# Patient Record
Sex: MALE | URBAN - METROPOLITAN AREA
[De-identification: names, ages, dates, MRNs, and addresses within clinical notes are randomized per-mention and may not be internally consistent; named-entity substitution may affect disease eponyms.]

---

## 2019-06-01 ENCOUNTER — EMERGENCY (EMERGENCY)
Facility: HOSPITAL | Age: 54
LOS: 0 days | Discharge: HOME | End: 2019-06-01
Attending: STUDENT IN AN ORGANIZED HEALTH CARE EDUCATION/TRAINING PROGRAM | Admitting: STUDENT IN AN ORGANIZED HEALTH CARE EDUCATION/TRAINING PROGRAM
Payer: MEDICAID

## 2019-06-01 VITALS
DIASTOLIC BLOOD PRESSURE: 75 MMHG | SYSTOLIC BLOOD PRESSURE: 125 MMHG | TEMPERATURE: 98 F | OXYGEN SATURATION: 97 % | HEART RATE: 76 BPM | RESPIRATION RATE: 18 BRPM

## 2019-06-01 VITALS
RESPIRATION RATE: 18 BRPM | HEART RATE: 78 BPM | OXYGEN SATURATION: 97 % | SYSTOLIC BLOOD PRESSURE: 111 MMHG | TEMPERATURE: 98 F | DIASTOLIC BLOOD PRESSURE: 68 MMHG

## 2019-06-01 DIAGNOSIS — R11.10 VOMITING, UNSPECIFIED: ICD-10-CM

## 2019-06-01 DIAGNOSIS — R11.2 NAUSEA WITH VOMITING, UNSPECIFIED: ICD-10-CM

## 2019-06-01 DIAGNOSIS — R42 DIZZINESS AND GIDDINESS: ICD-10-CM

## 2019-06-01 LAB
ALBUMIN SERPL ELPH-MCNC: 4.4 G/DL — SIGNIFICANT CHANGE UP (ref 3.5–5.2)
ALP SERPL-CCNC: 60 U/L — SIGNIFICANT CHANGE UP (ref 30–115)
ALT FLD-CCNC: 29 U/L — SIGNIFICANT CHANGE UP (ref 0–41)
ANION GAP SERPL CALC-SCNC: 13 MMOL/L — SIGNIFICANT CHANGE UP (ref 7–14)
AST SERPL-CCNC: 21 U/L — SIGNIFICANT CHANGE UP (ref 0–41)
BASOPHILS # BLD AUTO: 0.05 K/UL — SIGNIFICANT CHANGE UP (ref 0–0.2)
BASOPHILS NFR BLD AUTO: 0.3 % — SIGNIFICANT CHANGE UP (ref 0–1)
BILIRUB SERPL-MCNC: 0.5 MG/DL — SIGNIFICANT CHANGE UP (ref 0.2–1.2)
BUN SERPL-MCNC: 21 MG/DL — HIGH (ref 10–20)
CALCIUM SERPL-MCNC: 9.5 MG/DL — SIGNIFICANT CHANGE UP (ref 8.5–10.1)
CHLORIDE SERPL-SCNC: 105 MMOL/L — SIGNIFICANT CHANGE UP (ref 98–110)
CO2 SERPL-SCNC: 23 MMOL/L — SIGNIFICANT CHANGE UP (ref 17–32)
CREAT SERPL-MCNC: 1 MG/DL — SIGNIFICANT CHANGE UP (ref 0.7–1.5)
EOSINOPHIL # BLD AUTO: 0.02 K/UL — SIGNIFICANT CHANGE UP (ref 0–0.7)
EOSINOPHIL NFR BLD AUTO: 0.1 % — SIGNIFICANT CHANGE UP (ref 0–8)
GLUCOSE SERPL-MCNC: 164 MG/DL — HIGH (ref 70–99)
HCT VFR BLD CALC: 44.8 % — SIGNIFICANT CHANGE UP (ref 42–52)
HGB BLD-MCNC: 14.3 G/DL — SIGNIFICANT CHANGE UP (ref 14–18)
IMM GRANULOCYTES NFR BLD AUTO: 0.6 % — HIGH (ref 0.1–0.3)
LACTATE SERPL-SCNC: 1.7 MMOL/L — SIGNIFICANT CHANGE UP (ref 0.5–2.2)
LIDOCAIN IGE QN: 24 U/L — SIGNIFICANT CHANGE UP (ref 7–60)
LYMPHOCYTES # BLD AUTO: 0.85 K/UL — LOW (ref 1.2–3.4)
LYMPHOCYTES # BLD AUTO: 4.7 % — LOW (ref 20.5–51.1)
MCHC RBC-ENTMCNC: 22.1 PG — LOW (ref 27–31)
MCHC RBC-ENTMCNC: 31.9 G/DL — LOW (ref 32–37)
MCV RBC AUTO: 69.1 FL — LOW (ref 80–94)
MONOCYTES # BLD AUTO: 1.49 K/UL — HIGH (ref 0.1–0.6)
MONOCYTES NFR BLD AUTO: 8.3 % — SIGNIFICANT CHANGE UP (ref 1.7–9.3)
NEUTROPHILS # BLD AUTO: 15.42 K/UL — HIGH (ref 1.4–6.5)
NEUTROPHILS NFR BLD AUTO: 86 % — HIGH (ref 42.2–75.2)
NRBC # BLD: 0 /100 WBCS — SIGNIFICANT CHANGE UP (ref 0–0)
PLATELET # BLD AUTO: 235 K/UL — SIGNIFICANT CHANGE UP (ref 130–400)
POTASSIUM SERPL-MCNC: 3.9 MMOL/L — SIGNIFICANT CHANGE UP (ref 3.5–5)
POTASSIUM SERPL-SCNC: 3.9 MMOL/L — SIGNIFICANT CHANGE UP (ref 3.5–5)
PROT SERPL-MCNC: 7.5 G/DL — SIGNIFICANT CHANGE UP (ref 6–8)
RBC # BLD: 6.48 M/UL — HIGH (ref 4.7–6.1)
RBC # FLD: 17.8 % — HIGH (ref 11.5–14.5)
SODIUM SERPL-SCNC: 141 MMOL/L — SIGNIFICANT CHANGE UP (ref 135–146)
WBC # BLD: 17.94 K/UL — HIGH (ref 4.8–10.8)
WBC # FLD AUTO: 17.94 K/UL — HIGH (ref 4.8–10.8)

## 2019-06-01 PROCEDURE — 93010 ELECTROCARDIOGRAM REPORT: CPT

## 2019-06-01 PROCEDURE — 99284 EMERGENCY DEPT VISIT MOD MDM: CPT

## 2019-06-01 RX ORDER — FAMOTIDINE 10 MG/ML
20 INJECTION INTRAVENOUS ONCE
Refills: 0 | Status: COMPLETED | OUTPATIENT
Start: 2019-06-01 | End: 2019-06-01

## 2019-06-01 RX ORDER — ONDANSETRON 8 MG/1
4 TABLET, FILM COATED ORAL ONCE
Refills: 0 | Status: COMPLETED | OUTPATIENT
Start: 2019-06-01 | End: 2019-06-01

## 2019-06-01 RX ORDER — SODIUM CHLORIDE 9 MG/ML
2000 INJECTION, SOLUTION INTRAVENOUS ONCE
Refills: 0 | Status: COMPLETED | OUTPATIENT
Start: 2019-06-01 | End: 2019-06-01

## 2019-06-01 RX ADMIN — FAMOTIDINE 20 MILLIGRAM(S): 10 INJECTION INTRAVENOUS at 14:11

## 2019-06-01 RX ADMIN — SODIUM CHLORIDE 1000 MILLILITER(S): 9 INJECTION, SOLUTION INTRAVENOUS at 14:11

## 2019-06-01 RX ADMIN — ONDANSETRON 4 MILLIGRAM(S): 8 TABLET, FILM COATED ORAL at 14:10

## 2019-06-01 NOTE — ED ADULT NURSE NOTE - PMH
Hepatitis B infection without delta agent without hepatic coma, unspecified chronicity    Hypertension, unspecified type

## 2019-06-01 NOTE — ED ADULT TRIAGE NOTE - CHIEF COMPLAINT QUOTE
#[id number] BIBA for nausea vomiting and dizziness since 12noon, blood pressure was taken at home and was 71/53, pt has been feeling weakness x 3 days

## 2019-06-01 NOTE — ED PROVIDER NOTE - OBJECTIVE STATEMENT
52 y/o male with pmhx of HTN presents with vomiting. Per daughter, patient began vomiting NBNB approximately 1.5 hours prior to presentation, associated with lightheadedness; at that time, daughter measured patient's BP and found it to be 78/53. Patient denies fevers/chills, sob, chest pain, abdominal pain, diarrhea/constipation, hx of abdominal surgeries, dysuria.

## 2019-06-01 NOTE — ED PROVIDER NOTE - ATTENDING CONTRIBUTION TO CARE
54 y/o M pmh htn, p/w nb/nb vomiting x3 about 30 min after eating steamed rice and meat, given to him by friend. No sick contact or travel. Felt light headed; BP immediately after reported low. In ED pt feels well, has no complaints, abd s/nt 54 y/o M pmh htn, p/w nb/nb vomiting x3 about 30 min after eating steamed rice and meat, given to him by friend. No sick contact or travel. Felt light headed; BP immediately after reported low. In ED pt feels well, has no complaints, abd s/nt, well appearing. IMP: n/v. P labs, ivf, zofran, reassess.

## 2019-06-01 NOTE — ED ADULT NURSE NOTE - OBJECTIVE STATEMENT
Patient c/o vomiting NBNB emesis x2 this morning. Patient also states he had low blood pressure and weakness. Denies fever/d/sob/cp. On assessment patient shows no s/s of distress, skin pink. A&OX4.
liquor

## 2019-06-01 NOTE — ED ADULT NURSE NOTE - CHIEF COMPLAINT QUOTE
#[id number] BIBA for nausea vomiting and dizziness since 12noon, blood pressure was taken at home and was 71/53, pt has been feeling weakness x 3 days

## 2019-06-01 NOTE — ED PROVIDER NOTE - CLINICAL SUMMARY MEDICAL DECISION MAKING FREE TEXT BOX
No acute ED events. + PO tolerant. Pt has no sx and eager to go home. Pt stable for dc w/ PMD f/up, and care as discussed.  Pt/ family understands plan and signs and symptoms for ED return.

## 2019-06-01 NOTE — ED PROVIDER NOTE - NS ED ROS FT
Constitutional: See HPI.  Eyes: No visual changes, eye pain or discharge.  ENMT: No hearing changes, pain, discharge or infections. No neck pain or stiffness.  Cardiac: No chest pain, SOB or edema. No chest pain with exertion.  Respiratory: No cough or respiratory distress.   GI: + nausea, vomiting; No diarrhea or abdominal pain.  : No dysuria, frequency or burning.  MS: No myalgia, muscle weakness, joint pain or back pain.  Neuro: No headache or weakness. No LOC.  Skin: No skin rash.  Endo: No hx of DM, thyroid disease  Except as documented in HPI, all other review of systems is negative

## 2019-06-01 NOTE — ED ADULT NURSE NOTE - NSIMPLEMENTINTERV_GEN_ALL_ED
Implemented All Universal Safety Interventions:  [place] to call system. Call bell, personal items and telephone within reach. Instruct patient to call for assistance. Room bathroom lighting operational. Non-slip footwear when patient is off stretcher. Physically safe environment: no spills, clutter or unnecessary equipment. Stretcher in lowest position, wheels locked, appropriate side rails in place.

## 2019-07-01 ENCOUNTER — OUTPATIENT (OUTPATIENT)
Dept: OUTPATIENT SERVICES | Facility: HOSPITAL | Age: 54
LOS: 1 days | End: 2019-07-01
Payer: MEDICAID

## 2019-07-01 PROCEDURE — G9001: CPT

## 2019-07-09 DIAGNOSIS — Z71.89 OTHER SPECIFIED COUNSELING: ICD-10-CM

## 2019-07-09 PROBLEM — B19.10 UNSPECIFIED VIRAL HEPATITIS B WITHOUT HEPATIC COMA: Chronic | Status: ACTIVE | Noted: 2019-06-01

## 2019-07-09 PROBLEM — I10 ESSENTIAL (PRIMARY) HYPERTENSION: Chronic | Status: ACTIVE | Noted: 2019-06-01

## 2024-09-23 ENCOUNTER — EMERGENCY (EMERGENCY)
Facility: HOSPITAL | Age: 59
LOS: 0 days | Discharge: HOME | End: 2024-09-24
Attending: EMERGENCY MEDICINE
Payer: MEDICAID

## 2024-09-23 VITALS
DIASTOLIC BLOOD PRESSURE: 87 MMHG | SYSTOLIC BLOOD PRESSURE: 135 MMHG | RESPIRATION RATE: 20 BRPM | HEART RATE: 59 BPM | OXYGEN SATURATION: 97 % | TEMPERATURE: 98 F

## 2024-09-23 LAB
ACANTHOCYTES BLD QL SMEAR: SIGNIFICANT CHANGE UP
ALBUMIN SERPL ELPH-MCNC: 4.7 G/DL — SIGNIFICANT CHANGE UP (ref 3.5–5.2)
ALP SERPL-CCNC: 71 U/L — SIGNIFICANT CHANGE UP (ref 30–115)
ALT FLD-CCNC: 26 U/L — SIGNIFICANT CHANGE UP (ref 0–41)
ANION GAP SERPL CALC-SCNC: 11 MMOL/L — SIGNIFICANT CHANGE UP (ref 7–14)
ANISOCYTOSIS BLD QL: SLIGHT — SIGNIFICANT CHANGE UP
APTT BLD: 33.3 SEC — SIGNIFICANT CHANGE UP (ref 27–39.2)
AST SERPL-CCNC: 22 U/L — SIGNIFICANT CHANGE UP (ref 0–41)
BASOPHILS # BLD AUTO: 0 K/UL — SIGNIFICANT CHANGE UP (ref 0–0.2)
BASOPHILS NFR BLD AUTO: 0 % — SIGNIFICANT CHANGE UP (ref 0–1)
BILIRUB SERPL-MCNC: 0.6 MG/DL — SIGNIFICANT CHANGE UP (ref 0.2–1.2)
BLD GP AB SCN SERPL QL: SIGNIFICANT CHANGE UP
BUN SERPL-MCNC: 14 MG/DL — SIGNIFICANT CHANGE UP (ref 10–20)
BURR CELLS BLD QL SMEAR: PRESENT — SIGNIFICANT CHANGE UP
CALCIUM SERPL-MCNC: 9.2 MG/DL — SIGNIFICANT CHANGE UP (ref 8.4–10.5)
CHLORIDE SERPL-SCNC: 99 MMOL/L — SIGNIFICANT CHANGE UP (ref 98–110)
CO2 SERPL-SCNC: 22 MMOL/L — SIGNIFICANT CHANGE UP (ref 17–32)
CREAT SERPL-MCNC: 0.7 MG/DL — SIGNIFICANT CHANGE UP (ref 0.7–1.5)
EGFR: 105 ML/MIN/1.73M2 — SIGNIFICANT CHANGE UP
EOSINOPHIL # BLD AUTO: 0 K/UL — SIGNIFICANT CHANGE UP (ref 0–0.7)
EOSINOPHIL NFR BLD AUTO: 0 % — SIGNIFICANT CHANGE UP (ref 0–8)
GLUCOSE SERPL-MCNC: 157 MG/DL — HIGH (ref 70–99)
HCT VFR BLD CALC: 43.5 % — SIGNIFICANT CHANGE UP (ref 42–52)
HGB BLD-MCNC: 13.9 G/DL — LOW (ref 14–18)
INR BLD: 1.03 RATIO — SIGNIFICANT CHANGE UP (ref 0.65–1.3)
LYMPHOCYTES # BLD AUTO: 1.28 K/UL — SIGNIFICANT CHANGE UP (ref 1.2–3.4)
LYMPHOCYTES # BLD AUTO: 16.7 % — LOW (ref 20.5–51.1)
MANUAL SMEAR VERIFICATION: SIGNIFICANT CHANGE UP
MCHC RBC-ENTMCNC: 22.1 PG — LOW (ref 27–31)
MCHC RBC-ENTMCNC: 32 G/DL — SIGNIFICANT CHANGE UP (ref 32–37)
MCV RBC AUTO: 69.3 FL — LOW (ref 80–94)
MICROCYTES BLD QL: SLIGHT — SIGNIFICANT CHANGE UP
MONOCYTES # BLD AUTO: 0.34 K/UL — SIGNIFICANT CHANGE UP (ref 0.1–0.6)
MONOCYTES NFR BLD AUTO: 4.4 % — SIGNIFICANT CHANGE UP (ref 1.7–9.3)
NEUTROPHILS # BLD AUTO: 5.03 K/UL — SIGNIFICANT CHANGE UP (ref 1.4–6.5)
NEUTROPHILS NFR BLD AUTO: 65.8 % — SIGNIFICANT CHANGE UP (ref 42.2–75.2)
OVALOCYTES BLD QL SMEAR: SLIGHT — SIGNIFICANT CHANGE UP
PLAT MORPH BLD: ABNORMAL
PLATELET # BLD AUTO: 226 K/UL — SIGNIFICANT CHANGE UP (ref 130–400)
PMV BLD: 10.5 FL — HIGH (ref 7.4–10.4)
POIKILOCYTOSIS BLD QL AUTO: SIGNIFICANT CHANGE UP
POLYCHROMASIA BLD QL SMEAR: SLIGHT — SIGNIFICANT CHANGE UP
POTASSIUM SERPL-MCNC: 3.5 MMOL/L — SIGNIFICANT CHANGE UP (ref 3.5–5)
POTASSIUM SERPL-SCNC: 3.5 MMOL/L — SIGNIFICANT CHANGE UP (ref 3.5–5)
PROT SERPL-MCNC: 7.5 G/DL — SIGNIFICANT CHANGE UP (ref 6–8)
PROTHROM AB SERPL-ACNC: 11.8 SEC — SIGNIFICANT CHANGE UP (ref 9.95–12.87)
RBC # BLD: 6.28 M/UL — HIGH (ref 4.7–6.1)
RBC # FLD: 17.3 % — HIGH (ref 11.5–14.5)
RBC BLD AUTO: ABNORMAL
SMUDGE CELLS # BLD: PRESENT — SIGNIFICANT CHANGE UP
SODIUM SERPL-SCNC: 132 MMOL/L — LOW (ref 135–146)
TROPONIN T, HIGH SENSITIVITY RESULT: <6 NG/L — SIGNIFICANT CHANGE UP (ref 6–21)
VARIANT LYMPHS # BLD: 13.1 % — HIGH (ref 0–5)
WBC # BLD: 7.65 K/UL — SIGNIFICANT CHANGE UP (ref 4.8–10.8)
WBC # FLD AUTO: 7.65 K/UL — SIGNIFICANT CHANGE UP (ref 4.8–10.8)

## 2024-09-23 PROCEDURE — 86901 BLOOD TYPING SEROLOGIC RH(D): CPT

## 2024-09-23 PROCEDURE — G0378: CPT

## 2024-09-23 PROCEDURE — 70450 CT HEAD/BRAIN W/O DYE: CPT | Mod: MC

## 2024-09-23 PROCEDURE — 70551 MRI BRAIN STEM W/O DYE: CPT | Mod: MC

## 2024-09-23 PROCEDURE — 96374 THER/PROPH/DIAG INJ IV PUSH: CPT | Mod: XU

## 2024-09-23 PROCEDURE — 70498 CT ANGIOGRAPHY NECK: CPT | Mod: 26,MC

## 2024-09-23 PROCEDURE — 99284 EMERGENCY DEPT VISIT MOD MDM: CPT | Mod: 25

## 2024-09-23 PROCEDURE — 70450 CT HEAD/BRAIN W/O DYE: CPT | Mod: 26,MC,59

## 2024-09-23 PROCEDURE — 93005 ELECTROCARDIOGRAM TRACING: CPT

## 2024-09-23 PROCEDURE — 70496 CT ANGIOGRAPHY HEAD: CPT | Mod: MC

## 2024-09-23 PROCEDURE — 99223 1ST HOSP IP/OBS HIGH 75: CPT

## 2024-09-23 PROCEDURE — 0042T: CPT | Mod: MC

## 2024-09-23 PROCEDURE — 86850 RBC ANTIBODY SCREEN: CPT

## 2024-09-23 PROCEDURE — 85610 PROTHROMBIN TIME: CPT

## 2024-09-23 PROCEDURE — 84484 ASSAY OF TROPONIN QUANT: CPT

## 2024-09-23 PROCEDURE — 71045 X-RAY EXAM CHEST 1 VIEW: CPT | Mod: 26

## 2024-09-23 PROCEDURE — 36415 COLL VENOUS BLD VENIPUNCTURE: CPT

## 2024-09-23 PROCEDURE — 85025 COMPLETE CBC W/AUTO DIFF WBC: CPT

## 2024-09-23 PROCEDURE — 93010 ELECTROCARDIOGRAM REPORT: CPT

## 2024-09-23 PROCEDURE — 86900 BLOOD TYPING SEROLOGIC ABO: CPT

## 2024-09-23 PROCEDURE — 71045 X-RAY EXAM CHEST 1 VIEW: CPT

## 2024-09-23 PROCEDURE — 85730 THROMBOPLASTIN TIME PARTIAL: CPT

## 2024-09-23 PROCEDURE — 80053 COMPREHEN METABOLIC PANEL: CPT

## 2024-09-23 PROCEDURE — 70496 CT ANGIOGRAPHY HEAD: CPT | Mod: 26,MC

## 2024-09-23 PROCEDURE — 70498 CT ANGIOGRAPHY NECK: CPT | Mod: MC

## 2024-09-23 RX ORDER — TENOFOVIR DISOPROXIL FUMARATE 300 MG/1
300 TABLET, COATED ORAL ONCE
Refills: 0 | Status: DISCONTINUED | OUTPATIENT
Start: 2024-09-24 | End: 2024-09-24

## 2024-09-23 RX ORDER — LISINOPRIL 10 MG/1
20 TABLET ORAL ONCE
Refills: 0 | Status: DISCONTINUED | OUTPATIENT
Start: 2024-09-24 | End: 2024-09-24

## 2024-09-23 RX ORDER — ONDANSETRON 2 MG/ML
4 INJECTION, SOLUTION INTRAMUSCULAR; INTRAVENOUS ONCE
Refills: 0 | Status: COMPLETED | OUTPATIENT
Start: 2024-09-23 | End: 2024-09-23

## 2024-09-23 RX ORDER — MECLIZINE HYDROCHLORIDE 25 MG/1
25 TABLET ORAL ONCE
Refills: 0 | Status: COMPLETED | OUTPATIENT
Start: 2024-09-23 | End: 2024-09-23

## 2024-09-23 RX ORDER — SODIUM CHLORIDE 9 MG/ML
1000 INJECTION INTRAMUSCULAR; INTRAVENOUS; SUBCUTANEOUS ONCE
Refills: 0 | Status: COMPLETED | OUTPATIENT
Start: 2024-09-23 | End: 2024-09-23

## 2024-09-23 RX ADMIN — ONDANSETRON 4 MILLIGRAM(S): 2 INJECTION, SOLUTION INTRAMUSCULAR; INTRAVENOUS at 13:58

## 2024-09-23 RX ADMIN — MECLIZINE HYDROCHLORIDE 25 MILLIGRAM(S): 25 TABLET ORAL at 15:42

## 2024-09-23 RX ADMIN — MECLIZINE HYDROCHLORIDE 25 MILLIGRAM(S): 25 TABLET ORAL at 15:14

## 2024-09-23 RX ADMIN — SODIUM CHLORIDE 1000 MILLILITER(S): 9 INJECTION INTRAMUSCULAR; INTRAVENOUS; SUBCUTANEOUS at 15:14

## 2024-09-23 NOTE — ED PROVIDER NOTE - CLINICAL SUMMARY MEDICAL DECISION MAKING FREE TEXT BOX
Patient feeling much better after CT imaging CT imaging unremarkable will place in ED observation unit and get MRI Patient feeling much better after CT imaging CT imaging unremarkable will place in ED observation unit and get MRI.

## 2024-09-23 NOTE — ED PROVIDER NOTE - PROGRESS NOTE DETAILS
pk: stroke code called on arrival, NIH 0, labs imaging reviewed, will place into Obs for MRI. pt reports improvement of symptoms after medication.

## 2024-09-23 NOTE — ED ADULT NURSE NOTE - NSFALLUNIVINTERV_ED_ALL_ED
Bed/Stretcher in lowest position, wheels locked, appropriate side rails in place/Call bell, personal items and telephone in reach/Instruct patient to call for assistance before getting out of bed/chair/stretcher/Non-slip footwear applied when patient is off stretcher/[place] to call system/Physically safe environment - no spills, clutter or unnecessary equipment/Purposeful proactive rounding/Room/bathroom lighting operational, light cord in reach

## 2024-09-23 NOTE — ED ADULT NURSE NOTE - NSICDXPASTMEDICALHX_GEN_ALL_CORE_FT
PAST MEDICAL HISTORY:  Hepatitis B infection without delta agent without hepatic coma, unspecified chronicity     Hypertension, unspecified type

## 2024-09-23 NOTE — ED ADULT TRIAGE NOTE - CHIEF COMPLAINT QUOTE
Pt BIBA from home for sudden onset vision loss to both eyes (now resolved) followed by persistent dizziness and vomiting  denies recent head trauma   fs 146   stroke code called

## 2024-09-23 NOTE — ED PROVIDER NOTE - OBJECTIVE STATEMENT
Patient is a 59y Male p/w dizziness, blurry vision and nausea that started at 11:15 this morning. Otherwise denies any fever, chills, headache, cough, congestion, cp, palpitations, sob, v/d, abd pain, constipation, urinary complaints, lower extremity pain/swelling.

## 2024-09-23 NOTE — ED CDU PROVIDER INITIAL DAY NOTE - ATTENDING APP SHARED VISIT CONTRIBUTION OF CARE
[employer]  [id number] used.     58 yo M pmh of HTN, HLD, hepatitis B presents with room spinning dizziness. + N/V. Stroke code on arrival. Patient reports similar episodes a few years ago.  labs, imaging done. no acute findings. Seen by neurology team who recommends observation for MRI. no chest pain, no shortness of breath, no palpitations. no fever or recent illness.     CONSTITUTIONAL: Well-developed; well-nourished; in no acute distress.   SKIN: warm, dry  HEAD: Normocephalic; atraumatic.  EYES: PERRL, EOMI, no conjunctival erythema  ENT: No nasal discharge; airway clear.  NECK: Supple; non tender.  CARD: S1, S2 normal;  Regular rate and rhythm.   RESP: No wheezes, rales or rhonchi.  ABD: soft non tender, non distended, no rebound or guarding  EXT: Normal ROM.  5/5 strength in all 4 extremities   LYMPH: No acute cervical adenopathy.  NEURO: A&Ox3, CN 2-11 intact, moving all extremities, 5/5 strength, equal sensation bilaterally  PSYCH: Cooperative, appropriate.

## 2024-09-23 NOTE — ED CDU PROVIDER INITIAL DAY NOTE - CLINICAL SUMMARY MEDICAL DECISION MAKING FREE TEXT BOX
Patient presents with room spinning dizziness. stroke code on arrival. labs, imaging done. placed in obs for MRI as per neurology. Patient presents with room spinning dizziness. stroke code on arrival. labs, imaging done. placed in obs for MRI as per neurology..

## 2024-09-23 NOTE — ED PROVIDER NOTE - ATTENDING CONTRIBUTION TO CARE
59-year-old male to ED with dizziness and blurry vision that started at 11:15 AM.  He was in the yard called his daughter who evaluated him and called EMS.  Patient states he is feels everything is blurry and was feeling nauseous. Afebrile vital signs stable exam as noted clear lungs bilaterally conjunctiva pink HEENT normal, regular rate and rhythm abdomen soft nontender and neuro nonfocal. + nystagmus. 59-year-old male to ED with dizziness and blurry vision that started at 11:15 AM.  He was in the yard called his daughter who evaluated him and called EMS.  Patient states he is feels everything is blurry and was feeling nauseous. Afebrile vital signs stable exam as noted clear lungs bilaterally conjunctiva pink HEENT normal, regular rate and rhythm abdomen soft nontender and neuro nonfocal. + nystagmus.    Stroke code activated on arrival stroke team at bedside.  NIH stroke scale equals 1

## 2024-09-23 NOTE — ED CDU PROVIDER INITIAL DAY NOTE - OBJECTIVE STATEMENT
58 y/o M, PMHx HLD. HTN, NIDDM & Hepatitis B (on Tenofovir x many years), presented to the ED with complaints of dizziness that began this AM at approximately 1115. Patient admits to accompanying nausea and admits to exacerbation of symptoms of positional changes. He admits to two prior episodes of the same - last was approximately 2-3 years ago for which he came to the hospital however was never admitted. He denies any accompanying headache, fever, chills, recent illness, chest pain, dyspnea, abdominal pain and back pain. A stroke code was called upon patient arrival; neurology ultimately recommended observation for an MRI following negative CT/CTA.

## 2024-09-23 NOTE — ED CDU PROVIDER INITIAL DAY NOTE - PROGRESS NOTE DETAILS
Patient Cantonese speaking; offered translation services however he declined and requested for his daughter to translate.

## 2024-09-23 NOTE — CONSULT NOTE ADULT - SUBJECTIVE AND OBJECTIVE BOX
**STROKE CODE CONSULT NOTE**    Last known well time/Time of onset of symptoms: 11:15     HPI: 59y Male with PMHx of HLD. HTN, nonsmoker, not on any AP/AC, presenting to ED with sudden onset room spinning dizziness, involuntary eye movements, multiple episodes of vomiting starting at 11:15 AM. Pt was unable to keep his eyes open at home due to extreme dizziness, states he has b/l blurry vision at this time.  Stroke code in crit, /87. Pt denies hx of stroke, seizure, recent neck manipulations, neck pain, speech changes, focal deficits.       T(C): 36.4 (09-23-24 @ 12:43), Max: 36.4 (09-23-24 @ 12:43)  HR: 59 (09-23-24 @ 12:43) (59 - 59)  BP: 135/87 (09-23-24 @ 12:43) (135/87 - 135/87)  RR: 20 (09-23-24 @ 12:43) (20 - 20)  SpO2: 97% (09-23-24 @ 12:43) (97% - 97%)    PAST MEDICAL & SURGICAL HISTORY:  Hypertension, unspecified type      Hepatitis B infection without delta agent without hepatic coma, unspecified chronicity          FAMILY HISTORY:      SOCIAL HISTORY:  Denies smoking, drinking, or drug use    ROS: ***  Constitutional: No fever, weight loss or fatigue  Eyes: No eye pain, visual disturbances, or discharge  ENMT:  No difficulty hearing, tinnitus, vertigo; No sinus or throat pain  Neck: No pain or stiffness  Respiratory: No cough, wheezing, chills or hemoptysis  Cardiovascular: No chest pain, palpitations, shortness of breath, dizziness or leg swelling  Gastrointestinal: No abdominal pain. No nausea, vomiting or hematemesis; No diarrhea or constipation. Nohematochezia.  Genitourinary: No dysuria, frequency, hematuria or incontinence  Neurological: As per HPI  Skin: No itching, burning, rashes or lesions   Endocrine: No heat or cold intolerance; No hair loss  Musculoskeletal: No joint pain or swelling; No muscle, back or extremity pain  Psychiatric: No depression, anxiety, mood swings or difficulty sleeping  Heme/Lymph: No easy bruising or bleeding gums    MEDICATIONS  (STANDING):    MEDICATIONS  (PRN):    Home Medications:      Allergies    No Known Allergies    Intolerances      Vital Signs Last 24 Hrs  T(C): 36.4 (23 Sep 2024 12:43), Max: 36.4 (23 Sep 2024 12:43)  T(F): 97.5 (23 Sep 2024 12:43), Max: 97.5 (23 Sep 2024 12:43)  HR: 59 (23 Sep 2024 12:43) (59 - 59)  BP: 135/87 (23 Sep 2024 12:43) (135/87 - 135/87)  BP(mean): --  RR: 20 (23 Sep 2024 12:43) (20 - 20)  SpO2: 97% (23 Sep 2024 12:43) (97% - 97%)    Parameters below as of 23 Sep 2024 12:43  Patient On (Oxygen Delivery Method): room air        Physical exam:  General: No acute distress, awake and alert  Cardiovascular: Regular rate and rhythm, no murmurs, rubs, or gallops. No bruits  Pulmonary: Anterior breath sounds clear bilaterally, no crackles or wheezing. No use of accessory muscles  Extremities: Radial and DP pulses +2, no edema    Neurologic:  -Mental status: Awake, alert, oriented to person, place, and time. Speech is fluent with intact naming, repetition, and comprehension, no dysarthria. Recent and remote memory intact. Follows commands. Attention/concentration intact. Fund of knowledge appropriate.  -Cranial nerves:   II: Visual fields are full to confrontation.  III, IV, VI: Extraocular movements are intact without nystagmus. Pupils equally round and reactive to light  V:  Facial sensation V1-V3 equal and intact   VII: Face is symmetric with normal eye closure and smile  VIII: Hearing is bilaterally intact to finger rub  IX, X: Uvula is midline and soft palate rises symmetrically  XI: Head turning and shoulder shrug are intact.  XII: Tongue protrudes midline  Motor: Normal bulk and tone. No pronator drift. Strength bilateral upper extremity 5/5, bilateral lower extremities 5/5.  Rapid alternating movements intact and symmetric  Sensation: Intact to light touch bilaterally. No neglect or extinction on double simultaneous testing.  Coordination: No dysmetria on finger-to-nose and heel-to-shin bilaterally  Reflexes: Downgoing toes bilaterally   Gait: Narrow gait and steady    NIHSS: ****  ICH: *****  GCS: *****  ASPECTS Score: ****    Fingerstick Blood Glucose: CAPILLARY BLOOD GLUCOSE        LABS:                    RADIOLOGY & ADDITIONAL STUDIES:    HCT:    CTA:    CTP:      -----------------------------------------------------------------------------------------------------------------  IV-tenecetplase(Y/N):    ***                              Bolus time:  Reason IV-tenecetplase not given: ***  Alteplase dosing verified with RN _______      ASSESSMENT/PLAN:    59y Male w/ PMH ***. HCT showed ***. CTA showed ***. Given *** tenecetplase was ***.         **STROKE CODE CONSULT NOTE**    Last known well time/Time of onset of symptoms: 11:15     HPI: 59y Male with PMHx of HLD. HTN, nonsmoker, not on any AP/AC, presenting to ED with sudden onset room spinning dizziness, involuntary eye movements, multiple episodes of vomiting starting at 11:15 AM. Pt was unable to keep his eyes open at home due to extreme dizziness, states he has b/l blurry vision at this time.  Stroke code in crit, /87. Pt denies hx of stroke, seizure, recent neck manipulations, neck pain, speech changes, focal deficits.       T(C): 36.4 (09-23-24 @ 12:43), Max: 36.4 (09-23-24 @ 12:43)  HR: 59 (09-23-24 @ 12:43) (59 - 59)  BP: 135/87 (09-23-24 @ 12:43) (135/87 - 135/87)  RR: 20 (09-23-24 @ 12:43) (20 - 20)  SpO2: 97% (09-23-24 @ 12:43) (97% - 97%)    PAST MEDICAL & SURGICAL HISTORY:  Hypertension, unspecified type      Hepatitis B infection without delta agent without hepatic coma, unspecified chronicity          FAMILY HISTORY:      SOCIAL HISTORY:  Denies smoking, drinking, or drug use    ROS: as per HPI     MEDICATIONS  (STANDING):    MEDICATIONS  (PRN):    Home Medications:      Allergies    No Known Allergies    Intolerances      Vital Signs Last 24 Hrs  T(C): 36.4 (23 Sep 2024 12:43), Max: 36.4 (23 Sep 2024 12:43)  T(F): 97.5 (23 Sep 2024 12:43), Max: 97.5 (23 Sep 2024 12:43)  HR: 59 (23 Sep 2024 12:43) (59 - 59)  BP: 135/87 (23 Sep 2024 12:43) (135/87 - 135/87)  BP(mean): --  RR: 20 (23 Sep 2024 12:43) (20 - 20)  SpO2: 97% (23 Sep 2024 12:43) (97% - 97%)    Parameters below as of 23 Sep 2024 12:43  Patient On (Oxygen Delivery Method): room air        Physical exam:    Neurologic:  -Mental status: Awake, alert, oriented to person, place, and time. Speech is fluent with intact naming, repetition, and comprehension, no dysarthria. Recent and remote memory intact. Follows commands. Attention/concentration intact. Fund of knowledge appropriate.  -Cranial nerves:   II: Visual fields are full to confrontation.  III, IV, VI: left beating horizontal nystagmus non- fatiguable. Pupils equally round and reactive to light  V:  Facial sensation V1-V3 equal and intact   VII: Face is symmetric with normal eye closure and smile  Motor: Normal bulk and tone. No pronator drift. Strength bilateral upper extremity 5/5, bilateral lower extremities 5/5.  Rapid alternating movements intact and symmetric  Sensation: Intact to light touch bilaterally. No neglect or extinction on double simultaneous testing.  Coordination: No dysmetria on finger-to-nose and heel-to-shin bilaterally  Gait: unable to assess as pt extremely dizzy     NIHSS: 0    Fingerstick Blood Glucose: CAPILLARY BLOOD GLUCOSE        LABS:                    RADIOLOGY & ADDITIONAL STUDIES:    HCT: < from: CT Brain Stroke Protocol (09.23.24 @ 12:57) >  IMPRESSION:    No evidence of acute intracranial hemorrhage or large territory infarct.    < end of copied text >      CTA: < from: CT Angio Brain Stroke Protocol  w/ IV Cont (09.23.24 @ 13:15) >  IMPRESSION:    1.  No evidence of major vascular stenosis or occlusion. Normal perfusion   images.    2.  Solid nodule within the left parotid gland measuring about 3 cm.   Recommend ENT consultation on an outpatient/elective basis.    < end of copied text >      CTP:

## 2024-09-23 NOTE — ED PROVIDER NOTE - PHYSICAL EXAMINATION
CONSTITUTIONAL: mildly uncomfortable, vomiting in the ED  SKIN: Warm dry, normal skin turgor  HEAD: NCAT  EYES: EOMI, PERRLA, no scleral icterus, conjunctiva pink  ENT: normal pharynx with no erythema or exudates  NECK: Supple; non tender. Full ROM.  CARD: RRR, no murmurs.  RESP: clear to ausculation b/l. No crackles or wheezing.  ABD: soft, non-tender, non-distended, no rebound or guarding.  EXT: Full ROM, no bony tenderness, no pedal edema, no calf tenderness  NEURO: normal motor. normal sensory. horizontal nystagmus with rightwards gaze  PSYCH: Cooperative, appropriate.  NIH: 0

## 2024-09-23 NOTE — ED CDU PROVIDER INITIAL DAY NOTE - MDM ORDERS SUBMITTED SELECTION
Left vm to try and schedule the patients 4-6 week follow up with Dr. Garsia.  
Pt scheduled for 05/29/19 @ 11:15A.  
Imaging Studies/Medications

## 2024-09-23 NOTE — CONSULT NOTE ADULT - ASSESSMENT
59y Male with PMHx of HLD. HTN, nonsmoker, not on any AP/AC, presenting to ED with sudden onset room spinning dizziness, involuntary eye movements, multiple episodes of vomiting starting at 11:15 AM. Pt was unable to keep his eyes open at home due to extreme dizziness, states he has b/l blurry vision at this time.  Stroke code in crit, /87. NIHSS 0, pt with non- fatigable left beating nystagmus. HCT/CTA head and neck/CTP negative. No tnk as etiology likely peripheral.     Impression: 60 y/o M with sudden onset room spinning dizziness and left beating nystagmus, at this time imaging negative for acute infarct, LVO, or dissection. Will obtain further imaging to rule out PCA territory infarct however etiology may be due to peripheral vertigo.     RECS:  -Meclizine for symptoms   -MRI brain non con     Discussed with Dr. Maldonado

## 2024-09-23 NOTE — ED ADULT NURSE NOTE - OBJECTIVE STATEMENT
pt c/o loss of vision 1115 this am.. shortly after vision returned and pt c/o dizziness and nausea.. stroke code activated

## 2024-09-24 VITALS
DIASTOLIC BLOOD PRESSURE: 84 MMHG | SYSTOLIC BLOOD PRESSURE: 121 MMHG | TEMPERATURE: 99 F | OXYGEN SATURATION: 97 % | HEART RATE: 71 BPM | RESPIRATION RATE: 18 BRPM

## 2024-09-24 LAB — MANUAL DIF COMMENT BLD-IMP: SIGNIFICANT CHANGE UP

## 2024-09-24 PROCEDURE — 70551 MRI BRAIN STEM W/O DYE: CPT | Mod: 26,MC

## 2024-09-24 PROCEDURE — 99239 HOSP IP/OBS DSCHRG MGMT >30: CPT

## 2024-09-24 NOTE — ED CDU PROVIDER DISPOSITION NOTE - CLINICAL COURSE
Patient presents with room spinning dizziness. stroke code on arrival. labs, imaging done. placed in obs for MRI as per neurology. Symptoms fully resolved. MRI done which is negative except for incidental findings which were printed and discussed with patient and family. Discharged with pmd, neuro and ent follow up. Return precautions discussed.

## 2024-09-24 NOTE — ED CDU PROVIDER DISPOSITION NOTE - ATTENDING CONTRIBUTION TO CARE
[employer]  [id number] used.     58 yo M pmh of HTN, HLD, hepatitis B presents with room spinning dizziness. + N/V. Stroke code on arrival. Patient reports similar episodes a few years ago.  labs, imaging done. no acute findings. Seen by neurology team who recommends observation for MRI. no chest pain, no shortness of breath, no palpitations. no fever or recent illness.     CONSTITUTIONAL: Well-developed; well-nourished; in no acute distress.   SKIN: warm, dry  HEAD: Normocephalic; atraumatic.  EYES: PERRL, EOMI, no conjunctival erythema  ENT: No nasal discharge; airway clear.  NECK: Supple; non tender.  CARD: S1, S2 normal;  Regular rate and rhythm.   RESP: No wheezes, rales or rhonchi.  ABD: soft non tender, non distended, no rebound or guarding  EXT: Normal ROM.  5/5 strength in all 4 extremities   LYMPH: No acute cervical adenopathy.  NEURO: A&Ox3, CN 2-11 intact, moving all extremities, 5/5 strength, equal sensation bilaterally  PSYCH: Cooperative, appropriate.

## 2024-09-24 NOTE — ED CDU PROVIDER DISPOSITION NOTE - CARE PROVIDER_API CALL
Dia Luna  Neurology  [address] [place] [id number]  Phone: [phone]  Fax: [phone]  Follow Up Time: Routine    YOUR ENT PHYSICIAN,   Phone: (   )    -  Fax: (   )    -  Follow Up Time: Routine    YOUR PRIMARY CARE PHYSICIAN,   Phone: (   )    -  Fax: (   )    -  Follow Up Time: 1-3 Days

## 2024-09-24 NOTE — ED CDU PROVIDER DISPOSITION NOTE - NSFOLLOWUPINSTRUCTIONS_ED_ALL_ED_FT
Dizziness    WHAT YOU NEED TO KNOW:    Dizziness is a feeling of being off balance or unsteady. Common causes of dizziness are an inner ear fluid imbalance or a lack of oxygen in your blood. Dizziness may be acute (lasts 3 days or less) or chronic (lasts longer than 3 days). You may have dizzy spells that last from seconds to a few hours.     DISCHARGE INSTRUCTIONS:    Return to the emergency department if:     You have a headache and a stiff neck.      You have shaking chills and a fever.       You vomit over and over with no relief.       Your vomit or bowel movements are red or black.       You have pain in your chest, back, or abdomen.       You have numbness, especially in your face, arms, or legs.       You have trouble moving your arms or legs.       You are confused.     Contact your healthcare provider if:     You have a fever.       Your symptoms do not get better with treatment.       You have questions or concerns about your condition or care.     Manage your symptoms:     Do not drive or operate heavy machinery when you are dizzy.       Get up slowly from sitting or lying down.       Drink plenty of liquids. Liquids help prevent dehydration. Ask how much liquid to drink each day and which liquids are best for you.    Follow up with your healthcare provider as directed: Write down your questions so you remember to ask them during your visits.        © Copyright [employer] 2019 All illustrations and images included in CareNotes are the copyrighted property of [employer] Inc[employer] or [employer].

## 2024-09-24 NOTE — ED CDU PROVIDER DISPOSITION NOTE - CARE PROVIDERS DIRECT ADDRESSES
,[email or web address],DirectAddress_Unknown,DirectAddress_Unknown

## 2024-09-24 NOTE — ED CDU PROVIDER DISPOSITION NOTE - PATIENT PORTAL LINK FT
You can access the FollowMyHealth Patient Portal offered by [facility] by registering at the following website: [email or web address]. By joining [employer]’s FollowMyHealth portal, you will also be able to view your health information using other applications (apps) compatible with our system.

## 2024-09-24 NOTE — ED CDU PROVIDER SUBSEQUENT DAY NOTE - PROGRESS NOTE DETAILS
Received sign out this AM. Spoke with patient using Cantonese  [place] ID# [id number]. Patient feeling much improved since yesterday. He is pending MRI today. MRI results reviewed with patient and daughter Mirta; all incidental findings and the need for follow-up discussed. Will give neurology and they are advised to call to make appt. Patient already has an ENT physician and declines referral stating patient will follow with established ENT. Will give RX for Meclizine-directed on use. Strict return precautions discussed.

## 2024-09-24 NOTE — ED CDU PROVIDER SUBSEQUENT DAY NOTE - ATTENDING APP SHARED VISIT CONTRIBUTION OF CARE
[employer]  [id number] used.     60 yo M pmh of HTN, HLD, hepatitis B presents with room spinning dizziness. + N/V. Stroke code on arrival. Patient reports similar episodes a few years ago.  labs, imaging done. no acute findings. Seen by neurology team who recommends observation for MRI. no chest pain, no shortness of breath, no palpitations. no fever or recent illness.     CONSTITUTIONAL: Well-developed; well-nourished; in no acute distress.   SKIN: warm, dry  HEAD: Normocephalic; atraumatic.  EYES: PERRL, EOMI, no conjunctival erythema  ENT: No nasal discharge; airway clear.  NECK: Supple; non tender.  CARD: S1, S2 normal;  Regular rate and rhythm.   RESP: No wheezes, rales or rhonchi.  ABD: soft non tender, non distended, no rebound or guarding  EXT: Normal ROM.  5/5 strength in all 4 extremities   LYMPH: No acute cervical adenopathy.  NEURO: A&Ox3, CN 2-11 intact, moving all extremities, 5/5 strength, equal sensation bilaterally  PSYCH: Cooperative, appropriate.

## 2024-09-24 NOTE — ED CDU PROVIDER DISPOSITION NOTE - PROVIDER TOKENS
PROVIDER:[TOKEN:[[id number]],FOLLOWUP:[Routine]],FREE:[LAST:[YOUR ENT PHYSICIAN],PHONE:[(   )    -],FAX:[(   )    -],FOLLOWUP:[Routine]],FREE:[LAST:[YOUR PRIMARY CARE PHYSICIAN],PHONE:[(   )    -],FAX:[(   )    -],FOLLOWUP:[1-3 Days]]